# Patient Record
Sex: MALE | Race: WHITE | URBAN - METROPOLITAN AREA
[De-identification: names, ages, dates, MRNs, and addresses within clinical notes are randomized per-mention and may not be internally consistent; named-entity substitution may affect disease eponyms.]

---

## 2021-03-29 LAB — SARS-COV-2 AG RESP QL IA.RAPID: NEGATIVE

## 2021-03-31 ENCOUNTER — HISTORICAL (OUTPATIENT)
Dept: SURGERY | Facility: HOSPITAL | Age: 76
End: 2021-03-31

## 2021-04-26 LAB — SARS-COV-2 AG RESP QL IA.RAPID: NEGATIVE

## 2021-04-28 ENCOUNTER — HISTORICAL (OUTPATIENT)
Dept: SURGERY | Facility: HOSPITAL | Age: 76
End: 2021-04-28

## 2021-09-07 LAB — SARS-COV-2 AG RESP QL IA.RAPID: NEGATIVE

## 2021-09-08 ENCOUNTER — HISTORICAL (OUTPATIENT)
Dept: SURGERY | Facility: HOSPITAL | Age: 76
End: 2021-09-08

## 2022-04-10 ENCOUNTER — HISTORICAL (OUTPATIENT)
Dept: ADMINISTRATIVE | Facility: HOSPITAL | Age: 77
End: 2022-04-10

## 2022-04-28 VITALS
WEIGHT: 185.44 LBS | HEIGHT: 72 IN | DIASTOLIC BLOOD PRESSURE: 74 MMHG | SYSTOLIC BLOOD PRESSURE: 126 MMHG | BODY MASS INDEX: 25.12 KG/M2

## 2022-05-03 NOTE — HISTORICAL [facility]
This is a historical note converted from Richard. Formatting and pictures may have been removed.  Please reference Richard for original formatting and attached multimedia. Procedure Name  Bilateral?C3-6?medial branch blocks  ?   Pre-Procedure Diagnoses:  1. Chronic pain syndrome  2. Cervicalgia  3. Cervical facet arthropathy  ?   Post-Procedure Diagnoses:  1. Chronic pain syndrome  2. Cervicalgia  3. Cervical facet arthropathy  ?   Anesthesia:  Local and MAC  ?  Estimated Blood Loss:  None  ?  Complications:  None  ?  Informed Consent:  The procedure, risks, benefits, and alternatives were discussed with the patient.? There were no contraindications to the procedure.? The patient expressed understanding and agreed to proceed.? Fully informed written consent was obtained.?  ?  Description of the Procedure:  The patient was taken to the operating room.? IV access was obtained prior to the start of the procedure.? The patient was positioned prone on the fluoroscopy table.? Continuous hemodynamic monitoring was initiated and continued throughout the duration of the procedure.? The skin overlying the cervicothoracic spine was prepped with Chloraprep and draped into a sterile field.? Fluoroscopy was used to identify the locations of the?left side C3, C4, C5, and C6?medial branch nerves.? Skin anesthesia was achieved using?0.5 mL of 1% lidocaine over each injection site.? A 25-gauge 3.5-inch Quinke spinal needle was slowly inserted and advanced under intermittent fluoroscopic guidance until it made bony contact at the target sites.? Proper needle position was confirmed under AP, oblique, and lateral fluoroscopic views.? Negative aspiration for blood or CSF was confirmed.? Then a combination of?5 mg of Kenalog and?0.5 mL of 0.5% bupivacaine was easily injected at each level.? There was no pain on injection.? The needles were removed intact and bleeding was nil.? Sterile bandages were applied.? The patient was taken to the  recovery room for further observation in stable condition.? The patient was then discharged home without any complications.

## 2022-05-03 NOTE — HISTORICAL [facility]
This is a historical note converted from Richard. Formatting and pictures may have been removed.  Please reference Richard for original formatting and attached multimedia. Procedure Name  Cervical?Epidural Steroid Injection (C7-T1)  ?   Pre-Procedure Diagnoses:  1. Chronic pain syndrome  2. Cervicalgia  3. Cervical radiculopathy  4. Cervical disc displacement  ?   Post-Procedure Diagnoses:  1. Chronic pain syndrome  2. Cervicalgia  3. Cervical radiculopathy  4. Cervical disc displacement  ?   Anesthesia:  Local and MAC  ?  Estimated Blood Loss:  None  ?  Complications:  None  ?  Informed Consent:  The procedure, risks, benefits, and alternatives were discussed with the patient.? There were no contraindications to the procedure.? The patient expressed understanding and agreed to proceed.? Fully informed written consent was obtained.?  ?  Description of the Procedure:  The patient was taken to the operating room.? IV access was obtained prior to the start of the procedure.? The patient was positioned prone on the fluoroscopy table.? Continuous hemodynamic monitoring was initiated and continued throughout the duration of the procedure.? The skin overlying the cervicothoracic spine was prepped with Chloraprep and draped into a sterile field.? Fluoroscopy was used to identify the location of the C7-T1 interspace.? Skin anesthesia was achieved using 3?mL of 1% lidocaine.? An 18 gauge 3.5 inch Tuohy needle was slowly inserted and advanced?under intermittent fluoroscopy?and into the epidural space by loss of resistance to saline.? Proper needle position was confirmed under AP, oblique, and lateral fluoroscopic views.? Negative aspiration for blood or CSF was confirmed.? 0.5 mL of Isovue contrast was injected.? Fluoroscopic imaging revealed a clear spread of agent from C5 to C7.? Then a combination of?10 mg of dexamethasone and?2 mL of?preservative-free normal saline?was easily injected.? There was no pain on injection.? The  needle was removed intact and bleeding was nil.? Sterile bandages were applied.? The patient was taken to the recovery room for further observation in stable condition.? The patient was then discharged home without any complications.

## 2024-10-21 ENCOUNTER — HOSPITAL ENCOUNTER (EMERGENCY)
Facility: HOSPITAL | Age: 79
Discharge: HOME | End: 2024-10-21
Attending: EMERGENCY MEDICINE
Payer: OTHER GOVERNMENT

## 2024-10-21 VITALS
HEART RATE: 80 BPM | OXYGEN SATURATION: 99 % | HEIGHT: 72 IN | RESPIRATION RATE: 18 BRPM | TEMPERATURE: 97 F | WEIGHT: 166.69 LBS | SYSTOLIC BLOOD PRESSURE: 160 MMHG | BODY MASS INDEX: 22.58 KG/M2 | DIASTOLIC BLOOD PRESSURE: 90 MMHG

## 2024-10-21 DIAGNOSIS — R10.32 LEFT GROIN PAIN: Primary | ICD-10-CM

## 2024-10-21 DIAGNOSIS — K40.90 LEFT INGUINAL HERNIA: ICD-10-CM

## 2024-10-21 LAB
ALBUMIN SERPL-MCNC: 3.8 G/DL (ref 3.4–4.8)
ALBUMIN/GLOB SERPL: 1.1 RATIO (ref 1.1–2)
ALP SERPL-CCNC: 70 UNIT/L (ref 40–150)
ALT SERPL-CCNC: 11 UNIT/L (ref 0–55)
ANION GAP SERPL CALC-SCNC: 10 MEQ/L
AST SERPL-CCNC: 16 UNIT/L (ref 5–34)
BACTERIA #/AREA URNS AUTO: ABNORMAL /HPF
BASOPHILS # BLD AUTO: 0.14 X10(3)/MCL
BASOPHILS NFR BLD AUTO: 1.4 %
BILIRUB SERPL-MCNC: 0.6 MG/DL
BILIRUB UR QL STRIP.AUTO: NEGATIVE
BUN SERPL-MCNC: 23.9 MG/DL (ref 8.4–25.7)
CALCIUM SERPL-MCNC: 9.5 MG/DL (ref 8.8–10)
CHLORIDE SERPL-SCNC: 107 MMOL/L (ref 98–107)
CLARITY UR: CLEAR
CO2 SERPL-SCNC: 22 MMOL/L (ref 23–31)
COLOR UR AUTO: YELLOW
CREAT SERPL-MCNC: 1.22 MG/DL (ref 0.72–1.25)
CREAT/UREA NIT SERPL: 20
EOSINOPHIL # BLD AUTO: 0.24 X10(3)/MCL (ref 0–0.9)
EOSINOPHIL NFR BLD AUTO: 2.4 %
ERYTHROCYTE [DISTWIDTH] IN BLOOD BY AUTOMATED COUNT: 13 % (ref 11.5–17)
GFR SERPLBLD CREATININE-BSD FMLA CKD-EPI: 60 ML/MIN/1.73/M2
GLOBULIN SER-MCNC: 3.6 GM/DL (ref 2.4–3.5)
GLUCOSE SERPL-MCNC: 102 MG/DL (ref 82–115)
GLUCOSE UR QL STRIP: ABNORMAL
HCT VFR BLD AUTO: 51.6 % (ref 42–52)
HGB BLD-MCNC: 17.7 G/DL (ref 14–18)
HGB UR QL STRIP: NEGATIVE
HOLD SPECIMEN: NORMAL
HYALINE CASTS #/AREA URNS LPF: ABNORMAL /LPF
IMM GRANULOCYTES # BLD AUTO: 0.07 X10(3)/MCL (ref 0–0.04)
IMM GRANULOCYTES NFR BLD AUTO: 0.7 %
KETONES UR QL STRIP: ABNORMAL
LEUKOCYTE ESTERASE UR QL STRIP: NEGATIVE
LYMPHOCYTES # BLD AUTO: 1.93 X10(3)/MCL (ref 0.6–4.6)
LYMPHOCYTES NFR BLD AUTO: 18.9 %
MCH RBC QN AUTO: 30 PG (ref 27–31)
MCHC RBC AUTO-ENTMCNC: 34.3 G/DL (ref 33–36)
MCV RBC AUTO: 87.5 FL (ref 80–94)
MONOCYTES # BLD AUTO: 0.62 X10(3)/MCL (ref 0.1–1.3)
MONOCYTES NFR BLD AUTO: 6.1 %
MUCOUS THREADS URNS QL MICRO: ABNORMAL /LPF
NEUTROPHILS # BLD AUTO: 7.19 X10(3)/MCL (ref 2.1–9.2)
NEUTROPHILS NFR BLD AUTO: 70.5 %
NITRITE UR QL STRIP: NEGATIVE
NRBC BLD AUTO-RTO: 0 %
PH UR STRIP: 5 [PH]
PLATELET # BLD AUTO: 255 X10(3)/MCL (ref 130–400)
PMV BLD AUTO: 8.9 FL (ref 7.4–10.4)
POTASSIUM SERPL-SCNC: 3.8 MMOL/L (ref 3.5–5.1)
PROT SERPL-MCNC: 7.4 GM/DL (ref 5.8–7.6)
PROT UR QL STRIP: ABNORMAL
RBC # BLD AUTO: 5.9 X10(6)/MCL (ref 4.7–6.1)
RBC #/AREA URNS AUTO: ABNORMAL /HPF
SODIUM SERPL-SCNC: 139 MMOL/L (ref 136–145)
SP GR UR STRIP.AUTO: 1.04 (ref 1–1.03)
SQUAMOUS #/AREA URNS LPF: ABNORMAL /HPF
UROBILINOGEN UR STRIP-ACNC: ABNORMAL
WBC # BLD AUTO: 10.19 X10(3)/MCL (ref 4.5–11.5)
WBC #/AREA URNS AUTO: ABNORMAL /HPF

## 2024-10-21 PROCEDURE — 81001 URINALYSIS AUTO W/SCOPE: CPT | Performed by: NURSE PRACTITIONER

## 2024-10-21 PROCEDURE — 25000003 PHARM REV CODE 250: Performed by: NURSE PRACTITIONER

## 2024-10-21 PROCEDURE — 99284 EMERGENCY DEPT VISIT MOD MDM: CPT

## 2024-10-21 PROCEDURE — 80053 COMPREHEN METABOLIC PANEL: CPT | Performed by: NURSE PRACTITIONER

## 2024-10-21 PROCEDURE — 85025 COMPLETE CBC W/AUTO DIFF WBC: CPT | Performed by: NURSE PRACTITIONER

## 2024-10-21 RX ORDER — HYDROCODONE BITARTRATE AND ACETAMINOPHEN 5; 325 MG/1; MG/1
1 TABLET ORAL EVERY 8 HOURS PRN
Qty: 20 TABLET | Refills: 0 | Status: SHIPPED | OUTPATIENT
Start: 2024-10-21

## 2024-10-21 RX ORDER — HYDROCODONE BITARTRATE AND ACETAMINOPHEN 10; 325 MG/1; MG/1
1 TABLET ORAL
Status: COMPLETED | OUTPATIENT
Start: 2024-10-21 | End: 2024-10-21

## 2024-10-21 RX ORDER — POLYETHYLENE GLYCOL 3350 17 G/17G
17 POWDER, FOR SOLUTION ORAL DAILY
Qty: 7 EACH | Refills: 0 | Status: SHIPPED | OUTPATIENT
Start: 2024-10-21 | End: 2024-10-28

## 2024-10-21 RX ORDER — DOCUSATE SODIUM 100 MG/1
100 CAPSULE, LIQUID FILLED ORAL 2 TIMES DAILY PRN
Qty: 14 CAPSULE | Refills: 0 | Status: SHIPPED | OUTPATIENT
Start: 2024-10-21 | End: 2024-10-28

## 2024-10-21 RX ADMIN — HYDROCODONE BITARTRATE AND ACETAMINOPHEN 1 TABLET: 10; 325 TABLET ORAL at 06:10

## 2024-10-21 NOTE — DISCHARGE INSTRUCTIONS
Follow up with your primary care physician in 3-5 days for follow up evaluation.  Follow up with the [facility] as discussed for evaluation.  Take medication as prescribed.  Present to nearest ED immediately for inability to pass a BM or worsening abdominal pain or fever.

## 2024-10-21 NOTE — ED PROVIDER NOTES
Encounter Date: 10/21/2024       History     Chief Complaint   Patient presents with    Groin Pain     Patient reports left groin swelling and pain x 1.5 weeks. Seen at [place] and had ct scans done but was told they couldn't see anything on the scan.      Pt is an 78 yo. Male who presents to the [facility] ED for evaluation of his Lt lower groin. Reports intermittent swelling to area x 1.5 weeks. Seen for issue at an ED in [place] and had a CT of affected area. Reports being told that he had a bladder infection but denies being placed on medication for issue. Reports the provider palpated his inguinal area and did not note a hernia. Denies testicular pain or swelling. Denies chest pain, SOB, weakness, dizziness, or loss of bowel or bladder control. Hx of DM.       Review of patient's allergies indicates:   Allergen Reactions    Ibuprofen      Past Medical History:   Diagnosis Date    Diabetes mellitus      History reviewed. No pertinent surgical history.  No family history on file.  Social History     Tobacco Use    Smoking status: Every Day     Types: Cigarettes    Smokeless tobacco: Never     Review of Systems   Constitutional:  Negative for chills, diaphoresis, fatigue and fever.   HENT:  Negative for facial swelling, postnasal drip, rhinorrhea, sinus pressure, sinus pain, sore throat and trouble swallowing.    Respiratory:  Negative for cough, chest tightness, shortness of breath and wheezing.    Cardiovascular:  Negative for chest pain, palpitations and leg swelling.   Gastrointestinal:  Positive for abdominal pain. Negative for diarrhea, nausea and vomiting.   Genitourinary:  Negative for dysuria, flank pain, hematuria, penile pain, penile swelling, scrotal swelling, testicular pain and urgency.   Musculoskeletal:  Negative for arthralgias, back pain and myalgias.   Skin:  Negative for color change and rash.   Neurological:  Negative for dizziness, syncope, weakness and headaches.   Hematological:  Does not  bruise/bleed easily.   All other systems reviewed and are negative.      Physical Exam     Initial Vitals [10/21/24 1643]   BP Pulse Resp Temp SpO2   (!) 147/87 93 18 97.2 °F (36.2 °C) 98 %      MAP       --         Physical Exam    Nursing note and vitals reviewed.  Constitutional: Vital signs are normal. He appears well-developed and well-nourished.   HENT:   Head: Normocephalic.   Nose: Nose normal. Mouth/Throat: Oropharynx is clear and moist.   Eyes: Conjunctivae and EOM are normal. Pupils are equal, round, and reactive to light.   Neck: Neck supple.   Normal range of motion.  Cardiovascular:  Normal rate, regular rhythm, normal heart sounds and intact distal pulses.           Pulmonary/Chest: Effort normal and breath sounds normal. No respiratory distress. He has no wheezes. He has no rhonchi. He has no rales. He exhibits no tenderness.   Abdominal: Abdomen is soft and flat. Bowel sounds are normal. There is no abdominal tenderness. There is no rebound, no guarding, no tenderness at McBurney's point and negative Beaver's sign.   Musculoskeletal:         General: Normal range of motion.      Cervical back: Normal range of motion and neck supple.     Neurological: He is alert and oriented to person, place, and time. He has normal strength.   Skin: Skin is warm and dry. Capillary refill takes less than 2 seconds.   Psychiatric: He has a normal mood and affect. His behavior is normal. Judgment and thought content normal.         ED Course   Procedures  Labs Reviewed   COMPREHENSIVE METABOLIC PANEL - Abnormal       Result Value    Sodium 139      Potassium 3.8      Chloride 107      CO2 22 (*)     Glucose 102      Blood Urea Nitrogen 23.9      Creatinine 1.22      Calcium 9.5      Protein Total 7.4      Albumin 3.8      Globulin 3.6 (*)     Albumin/Globulin Ratio 1.1      Bilirubin Total 0.6      ALP 70      ALT 11      AST 16      eGFR 60      Anion Gap 10.0      BUN/Creatinine Ratio 20     URINALYSIS, REFLEX TO  URINE CULTURE - Abnormal    Color, UA Yellow      Appearance, UA Clear      Specific Gravity, UA 1.041 (*)     pH, UA 5.0      Protein, UA Trace (*)     Glucose, UA 4+ (*)     Ketones, UA Trace (*)     Blood, UA Negative      Bilirubin, UA Negative      Urobilinogen, UA 1+ (*)     Nitrites, UA Negative      Leukocyte Esterase, UA Negative      RBC, UA 0-5      WBC, UA 0-5      Bacteria, UA None Seen      Squamous Epithelial Cells, UA Trace (*)     Mucous, UA Trace (*)     Hyaline Casts, UA None Seen     CBC WITH DIFFERENTIAL - Abnormal    WBC 10.19      RBC 5.90      Hgb 17.7      Hct 51.6      MCV 87.5      MCH 30.0      MCHC 34.3      RDW 13.0      Platelet 255      MPV 8.9      Neut % 70.5      Lymph % 18.9      Mono % 6.1      Eos % 2.4      Basophil % 1.4      Lymph # 1.93      Neut # 7.19      Mono # 0.62      Eos # 0.24      Baso # 0.14      IG# 0.07 (*)     IG% 0.7      NRBC% 0.0     CBC W/ AUTO DIFFERENTIAL    Narrative:     The following orders were created for panel order CBC auto differential.  Procedure                               Abnormality         Status                     ---------                               -----------         ------                     CBC with Differential[[id number]]       Abnormal            Final result                 Please view results for these tests on the individual orders.   EXTRA TUBES    Narrative:     The following orders were created for panel order EXTRA TUBES.  Procedure                               Abnormality         Status                     ---------                               -----------         ------                     Light Blue Top Hold[[id number]]                             In process                 Light Green Top Hold[[id number]]                            In process                 Lavender Top Hold[[id number]]                               In process                 Gold Top Hold[[id number]]                                   In process                    Please view results for these tests on the individual orders.   LIGHT BLUE TOP HOLD   LIGHT GREEN TOP HOLD   LAVENDER TOP HOLD   GOLD TOP HOLD          Imaging Results    None          Medications   HYDROcodone-acetaminophen  mg per tablet 1 tablet (1 tablet Oral Given 10/21/24 1844)     Medical Decision Making  Differential:  Hernia  Muscle strain  Abdominal pain  UTI    Amount and/or Complexity of Data Reviewed  External Data Reviewed: radiology.     Details: Exam Date Time Procedure Performing Provider Status  10/18/24 3:56 PM CT Abdomen and Pelvis w/ Contrast Rah Churchill; Modified  Notes:   (CT Abdomen and Pelvis w/ Contrast) Reason For Exam: left groin pain, h/o hernia repair 1 year ago   CT Abdomen and Pelvis w/ Contrast  INDICATION: Left groin pain    COMPARISON: September 9, 2022    TECHNIQUE: Contiguous axial images were obtained through the abdomen  and pelvis after the administration of intravenous contrast. 100 cc of  Isovue-370 was administered intravenously. 0 cc was discarded.  Automated exposure control was utilized for dose limitation.    FINDINGS:  Lower Chest: Mild bronchiectasis. Mild bronchial wall thickening.  Small calcified right base lung nodule.  Hepatobiliary: Punctate liver calcification. Left liver lobe medial  segment band of decreased enhancement, similar to the patient exam.  Mild intrahepatic ductal dilatation, similar to the previous exam.  Previous cholecystectomy.  Pancreas, Spleen, and Adrenal Glands: Punctate splenic calcification.  Normal pancreas. Normal adrenal glands.  Kidneys and Ureters: Small stable left renal masses.  Urinary Bladder: Diffuse urinary bladder wall thickening.  Reproductive: Prostatic enlargement.  Stomach and Bowel: Numerous colonic diverticula. Colon distention with  stool. Duodenal and proximal small bowel wall thickening. Normal  stomach.  Appendix: Previous appendectomy.  Peritoneum and Retroperitoneum: Groundglass  infiltration of the  mesentery, similar to the previous exam.  Lymph Nodes: No lymphadenopathy.  Vasculature: Aortoiliac calcification. Infrarenal abdominal aortic  ectasia with a maximum aortic AP diameter of 2.5 cm.  Abdominal Wall and Soft tissue: Unremarkable. Fat-containing left  internal hernia. A colonic diverticulum extends into the left internal  inguinal ring.  Skeleton: No skeletal mass suspicious for malignancy.    IMPRESSION:  1. Old granulomatous disease.  2. Colon distention with stool. Diverticulosis.  3. Duodenal and proximal small bowel wall thickening. Clinical  exclusion of enteritis is recommended.  4. Diffuse urinary bladder wall thickening. Prostatic enlargement.  5. Fat-containing left inguinal hernia. A sigmoid colon diverticulum  extends into the left inguinal internal ring.  6. Left renal cysts.    Dictated by: Dara Li MD  Dictated DT/TM: 10/18/2024 4:16 pm  Signed by: Dara Li MD  Signed (Electronic Signature): 10/18/2024 4:25 pm  Transcribed by: WEN     Labs: ordered.    Risk  OTC drugs.  Prescription drug management.               ED Course as of 10/21/24 1854   Mon Oct 21, 2024   1841 I have discussed pt status and results including his CT from previous ED visit with Dr. Wong, ED attending. Physician agrees with plan to address pt's pain control and to have him follow up with the [facility] for evaluation of developing inguinal hernia formation. Discussed with pt treatment plan as well as his need to return to the ED immediately for changes in bowel motility. Pt will be placed on a stool softener at today's visit. Understanding and agreement of plan voiced per pt.  [JA]      ED Course User Index  [JA] Arie Riojas Jr., FNP                           Clinical Impression:  Final diagnoses:  [R10.32] Left groin pain (Primary)  [K40.90] Left inguinal hernia          ED Disposition Condition    Discharge Stable          ED Prescriptions       Medication Sig  Dispense Start Date End Date Auth. Provider    HYDROcodone-acetaminophen (NORCO) 5-325 mg per tablet Take 1 tablet by mouth every 8 (eight) hours as needed for Pain. May take up to a total of 2 tablets every 8 hours as needed for acute pain episodes. 20 tablet 10/21/2024 -- Arie Riojas Jr., FNP    docusate sodium (COLACE) 100 MG capsule Take 1 capsule (100 mg total) by mouth 2 (two) times daily as needed for Constipation. 14 capsule 10/21/2024 10/28/2024 Arie Riojas Jr., FNP    polyethylene glycol (GLYCOLAX) 17 gram PwPk Take 17 g by mouth once daily. for 7 days 7 each 10/21/2024 10/28/2024 Arie Riojas Jr., FNP          Follow-up Information       Follow up With Specialties Details Why Contact Info    [facility] Emergency Medicine In 3 days As needed, If symptoms worsen [address] [facility] [id number] [phone]    [facility]  Schedule an appointment as soon as possible for a visit in 2 weeks Follow up with [facility] in next 1-2 weeks for evalaution. [address] [facility] [id number]             Arie Riojas Jr., FNP  10/21/24 3936

## 2024-10-29 ENCOUNTER — OFFICE VISIT (OUTPATIENT)
Dept: SURGERY | Facility: CLINIC | Age: 79
End: 2024-10-29
Payer: OTHER GOVERNMENT

## 2024-10-29 VITALS
TEMPERATURE: 98 F | DIASTOLIC BLOOD PRESSURE: 67 MMHG | OXYGEN SATURATION: 97 % | HEART RATE: 77 BPM | BODY MASS INDEX: 22.84 KG/M2 | RESPIRATION RATE: 20 BRPM | WEIGHT: 168.63 LBS | SYSTOLIC BLOOD PRESSURE: 130 MMHG | HEIGHT: 72 IN

## 2024-10-29 DIAGNOSIS — F17.200 NEEDS SMOKING CESSATION EDUCATION: ICD-10-CM

## 2024-10-29 DIAGNOSIS — K40.90 LEFT INGUINAL HERNIA: ICD-10-CM

## 2024-10-29 PROCEDURE — 99215 OFFICE O/P EST HI 40 MIN: CPT | Mod: PBBFAC

## 2024-10-29 RX ORDER — GLIPIZIDE 10 MG/1
TABLET ORAL
COMMUNITY

## 2024-10-29 RX ORDER — ALOGLIPTIN 25 MG/1
1 TABLET, FILM COATED ORAL DAILY
COMMUNITY